# Patient Record
Sex: MALE | Employment: UNEMPLOYED | URBAN - METROPOLITAN AREA
[De-identification: names, ages, dates, MRNs, and addresses within clinical notes are randomized per-mention and may not be internally consistent; named-entity substitution may affect disease eponyms.]

---

## 2020-05-13 ENCOUNTER — OFFICE VISIT (OUTPATIENT)
Dept: ORTHOPEDIC SURGERY | Age: 25
End: 2020-05-13

## 2020-05-13 VITALS — TEMPERATURE: 98.2 F

## 2020-05-13 DIAGNOSIS — M89.8X1 CLAVICLE PAIN: Primary | ICD-10-CM

## 2020-05-13 DIAGNOSIS — S42.012A CLOSED ANTERIOR DISPLACED FRACTURE OF STERNAL END OF LEFT CLAVICLE, INITIAL ENCOUNTER: ICD-10-CM

## 2020-05-13 RX ORDER — LISINOPRIL 5 MG/1
TABLET ORAL DAILY
COMMUNITY
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
## 2020-12-03 ENCOUNTER — OFFICE VISIT (OUTPATIENT)
Dept: CARDIOLOGY CLINIC | Age: 25
End: 2020-12-03
Payer: COMMERCIAL

## 2020-12-03 VITALS
DIASTOLIC BLOOD PRESSURE: 82 MMHG | SYSTOLIC BLOOD PRESSURE: 142 MMHG | OXYGEN SATURATION: 98 % | BODY MASS INDEX: 21.26 KG/M2 | HEART RATE: 90 BPM | WEIGHT: 171 LBS | HEIGHT: 75 IN

## 2020-12-03 DIAGNOSIS — R55 SYNCOPE, UNSPECIFIED SYNCOPE TYPE: Primary | ICD-10-CM

## 2020-12-03 DIAGNOSIS — R55 SYNCOPE, UNSPECIFIED SYNCOPE TYPE: ICD-10-CM

## 2020-12-03 PROCEDURE — 99205 OFFICE O/P NEW HI 60 MIN: CPT | Performed by: INTERNAL MEDICINE

## 2020-12-03 NOTE — PROGRESS NOTES
Dilma Lares presents today for   Chief Complaint   Patient presents with   [facility] Follow Up     Went to [facility] for Dizziness    New Patient     For Syncope       Dilma Lares preferred language for health care discussion is english/other. Is someone accompanying this pt? no    Is the patient using any DME equipment during [address]? no    Depression Screening:  3 most recent PHQ Screens 12/3/2020   Little interest or pleasure in doing things Not at all   Feeling down, depressed, irritable, or hopeless Not at all   Total Score PHQ 2 0       Learning Assessment:  Learning Assessment 12/3/2020   PRIMARY LEARNER Patient   PRIMARY LANGUAGE ENGLISH   LEARNER PREFERENCE PRIMARY DEMONSTRATION   ANSWERED BY Patient   RELATIONSHIP SELF       Abuse Screening:  Abuse Screening Questionnaire 12/3/2020   Do you ever feel afraid of your partner? N   Are you in a relationship with someone who physically or mentally threatens you? N   Is it safe for you to go home? Y       Fall Risk  Fall Risk Assessment, last 12 mths 12/3/2020   Able to walk? Yes   Fall in past 12 months? No       Pt currently taking Anticoagulant therapy? no    Coordination of Care:  1. Have you been to the ER, urgent care clinic since your last visit? Hospitalized since your last visit? no    2. Have you seen or consulted any other health care providers outside of the [address] [place] [address] since your last visit? Include any pap smears or colon screening.  no
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
## 2020-12-09 ENCOUNTER — TELEPHONE (OUTPATIENT)
Dept: CARDIOLOGY CLINIC | Age: 25
End: 2020-12-09

## 2020-12-09 NOTE — TELEPHONE ENCOUNTER
Called and left voicemail message to remind patient of echocardiogram appointment tomorrow.     Spencer Alfredo, RCS, RDCS

## 2020-12-11 LAB
ECHO AO ROOT DIAM: 3.19 CM
ECHO LA AREA 4C: 19.93 CM2
ECHO LA VOL 2C: 61.49 ML (ref 18–58)
ECHO LA VOL 4C: 45.06 ML (ref 18–58)
ECHO LA VOL BP: 64.61 ML (ref 18–58)
ECHO LA VOL/BSA BIPLANE: 31.47 ML/M2 (ref 16–28)
ECHO LA VOLUME INDEX A2C: 29.95 ML/M2 (ref 16–28)
ECHO LA VOLUME INDEX A4C: 21.95 ML/M2 (ref 16–28)
ECHO LV E' LATERAL VELOCITY: 17.54 CM/S
ECHO LV E' SEPTAL VELOCITY: 17.46 CM/S
ECHO LV INTERNAL DIMENSION DIASTOLIC: 5.49 CM (ref 4.2–5.9)
ECHO LV INTERNAL DIMENSION SYSTOLIC: 3.09 CM
ECHO LV IVSD: 0.91 CM (ref 0.6–1)
ECHO LV MASS 2D: 190.6 G (ref 88–224)
ECHO LV MASS INDEX 2D: 92.8 G/M2 (ref 49–115)
ECHO LV POSTERIOR WALL DIASTOLIC: 0.93 CM (ref 0.6–1)
ECHO LVOT CARDIAC OUTPUT: 5.05 LITER/MINUTE
ECHO LVOT DIAM: 2.3 CM
ECHO LVOT PEAK GRADIENT: 3.63 MMHG
ECHO LVOT PEAK VELOCITY: 95.21 CM/S
ECHO LVOT SV: 63.5 ML
ECHO LVOT SV: 85.7 ML
ECHO LVOT VTI: 20.61 CM
ECHO MV A VELOCITY: 35.31 CM/S
ECHO MV E DECELERATION TIME (DT): 144.44 MS
ECHO MV E VELOCITY: 67.92 CM/S
ECHO MV E/A RATIO: 1.92
ECHO MV E/E' LATERAL: 3.87
ECHO MV E/E' RATIO (AVERAGED): 3.88
ECHO MV E/E' SEPTAL: 3.89
ECHO RV TAPSE: 2.12 CM (ref 1.5–2)
ECHO TV REGURGITANT MAX VELOCITY: 262.91 CM/S
ECHO TV REGURGITANT PEAK GRADIENT: 27.65 MMHG
LVOT MG: 1.94 MMHG

## 2020-12-13 NOTE — PROGRESS NOTES
Per your last note \"His functional status is good and his blood pressure is well controlled at home. I do not have a good etiology for his symptoms, but there is the possibility it could be related to hypoglycemia versus a vagal response. Arrhythmia, hypotension or seizures cannot be excluded. He had a remote neurology workup back in high school with MRI scan that was unremarkable by report.     At this point I recommended an echocardiogram to evaluate left ventricular function and to make sure he has a structurally normal heart. I am also going to check a TSH. If he has more events, it may be reasonable to consider an event monitor and neurology evaluation to exclude possible seizures.   All questions answered

## 2020-12-16 ENCOUNTER — TELEPHONE (OUTPATIENT)
Dept: CARDIOLOGY CLINIC | Age: 25
End: 2020-12-16

## 2020-12-16 NOTE — TELEPHONE ENCOUNTER
----- Message from Debbie Day MD sent at 12/15/2020  8:17 AM EST -----  Please let patient know echo normal, continue to monitor, no changes. Thx  ----- Message -----  From: Karolina Rosa RN  Sent: 12/13/2020  12:20 PM EST  To: Debbie Day MD    Per your last note \"His functional status is good and his blood pressure is well controlled at home. I do not have a good etiology for his symptoms, but there is the possibility it could be related to hypoglycemia versus a vagal response. Arrhythmia, hypotension or seizures cannot be excluded. He had a remote neurology workup back in high school with MRI scan that was unremarkable by report.     At this point I recommended an echocardiogram to evaluate left ventricular function and to make sure he has a structurally normal heart. I am also going to check a TSH. If he has more events, it may be reasonable to consider an event monitor and neurology evaluation to exclude possible seizures.   All questions answered

## 2020-12-23 NOTE — TELEPHONE ENCOUNTER
Called the patient about results, no answer left message on voicemail for them to call the office.      GREG Chapman, KATI, CPT    MA [facility]

## 2020-12-23 NOTE — TELEPHONE ENCOUNTER
----- Message from Alta Cruz MD sent at 12/15/2020  8:17 AM EST -----  Please let patient know echo normal, continue to monitor, no changes. Thx  ----- Message -----  From: Belia Van RN  Sent: 12/13/2020  12:20 PM EST  To: lAta Cruz MD    Per your last note \"His functional status is good and his blood pressure is well controlled at home. I do not have a good etiology for his symptoms, but there is the possibility it could be related to hypoglycemia versus a vagal response. Arrhythmia, hypotension or seizures cannot be excluded. He had a remote neurology workup back in high school with MRI scan that was unremarkable by report.     At this point I recommended an echocardiogram to evaluate left ventricular function and to make sure he has a structurally normal heart. I am also going to check a TSH. If he has more events, it may be reasonable to consider an event monitor and neurology evaluation to exclude possible seizures.   All questions answered

## 2024-04-08 NOTE — TELEPHONE ENCOUNTER
I called patient and left message to call back to schedule colonoscopy.  First attempt.   Unable to reach letter mailed to patient